# Patient Record
Sex: FEMALE | Race: WHITE | Employment: FULL TIME | URBAN - METROPOLITAN AREA
[De-identification: names, ages, dates, MRNs, and addresses within clinical notes are randomized per-mention and may not be internally consistent; named-entity substitution may affect disease eponyms.]

---

## 2020-09-24 ENCOUNTER — APPOINTMENT (OUTPATIENT)
Dept: GENERAL RADIOLOGY | Age: 55
End: 2020-09-24
Payer: OTHER GOVERNMENT

## 2020-09-24 ENCOUNTER — HOSPITAL ENCOUNTER (EMERGENCY)
Age: 55
Discharge: HOME | End: 2020-09-24
Attending: EMERGENCY MEDICINE
Payer: OTHER GOVERNMENT

## 2020-09-24 VITALS
TEMPERATURE: 98.3 F | WEIGHT: 152 LBS | HEART RATE: 58 BPM | OXYGEN SATURATION: 98 % | SYSTOLIC BLOOD PRESSURE: 147 MMHG | DIASTOLIC BLOOD PRESSURE: 93 MMHG | HEIGHT: 57 IN | RESPIRATION RATE: 16 BRPM | BODY MASS INDEX: 32.79 KG/M2

## 2020-09-24 LAB
ALBUMIN SERPL-MCNC: 4.5 GM/DL (ref 3.4–5)
ALP BLD-CCNC: 84 IU/L (ref 40–129)
ALT SERPL-CCNC: 10 U/L (ref 10–40)
ANION GAP SERPL CALCULATED.3IONS-SCNC: 10 MMOL/L (ref 4–16)
AST SERPL-CCNC: 16 IU/L (ref 15–37)
BASOPHILS ABSOLUTE: 0.1 K/CU MM
BASOPHILS RELATIVE PERCENT: 0.5 % (ref 0–1)
BILIRUB SERPL-MCNC: 0.4 MG/DL (ref 0–1)
BUN BLDV-MCNC: 14 MG/DL (ref 6–23)
CALCIUM SERPL-MCNC: 9.6 MG/DL (ref 8.3–10.6)
CHLORIDE BLD-SCNC: 106 MMOL/L (ref 99–110)
CO2: 26 MMOL/L (ref 21–32)
CREAT SERPL-MCNC: 1 MG/DL (ref 0.6–1.1)
DIFFERENTIAL TYPE: ABNORMAL
EOSINOPHILS ABSOLUTE: 0.2 K/CU MM
EOSINOPHILS RELATIVE PERCENT: 1.5 % (ref 0–3)
GFR AFRICAN AMERICAN: >60 ML/MIN/1.73M2
GFR NON-AFRICAN AMERICAN: 58 ML/MIN/1.73M2
GLUCOSE BLD-MCNC: 94 MG/DL (ref 70–99)
HCT VFR BLD CALC: 39.9 % (ref 37–47)
HEMOGLOBIN: 12.7 GM/DL (ref 12.5–16)
IMMATURE NEUTROPHIL %: 0.2 % (ref 0–0.43)
LYMPHOCYTES ABSOLUTE: 2.7 K/CU MM
LYMPHOCYTES RELATIVE PERCENT: 27 % (ref 24–44)
MCH RBC QN AUTO: 27.7 PG (ref 27–31)
MCHC RBC AUTO-ENTMCNC: 31.8 % (ref 32–36)
MCV RBC AUTO: 87.1 FL (ref 78–100)
MONOCYTES ABSOLUTE: 0.6 K/CU MM
MONOCYTES RELATIVE PERCENT: 6 % (ref 0–4)
PDW BLD-RTO: 13 % (ref 11.7–14.9)
PLATELET # BLD: 338 K/CU MM (ref 140–440)
PMV BLD AUTO: 10.9 FL (ref 7.5–11.1)
POTASSIUM SERPL-SCNC: 4.3 MMOL/L (ref 3.5–5.1)
PRO-BNP: 145.1 PG/ML
RBC # BLD: 4.58 M/CU MM (ref 4.2–5.4)
SEGMENTED NEUTROPHILS ABSOLUTE COUNT: 6.4 K/CU MM
SEGMENTED NEUTROPHILS RELATIVE PERCENT: 64.8 % (ref 36–66)
SODIUM BLD-SCNC: 142 MMOL/L (ref 135–145)
TOTAL IMMATURE NEUTOROPHIL: 0.02 K/CU MM
TOTAL PROTEIN: 7.3 GM/DL (ref 6.4–8.2)
TROPONIN T: <0.01 NG/ML
WBC # BLD: 10 K/CU MM (ref 4–10.5)

## 2020-09-24 PROCEDURE — 80053 COMPREHEN METABOLIC PANEL: CPT

## 2020-09-24 PROCEDURE — 6370000000 HC RX 637 (ALT 250 FOR IP): Performed by: EMERGENCY MEDICINE

## 2020-09-24 PROCEDURE — 99284 EMERGENCY DEPT VISIT MOD MDM: CPT

## 2020-09-24 PROCEDURE — 71046 X-RAY EXAM CHEST 2 VIEWS: CPT

## 2020-09-24 PROCEDURE — 93005 ELECTROCARDIOGRAM TRACING: CPT | Performed by: EMERGENCY MEDICINE

## 2020-09-24 PROCEDURE — 84484 ASSAY OF TROPONIN QUANT: CPT

## 2020-09-24 PROCEDURE — 83880 ASSAY OF NATRIURETIC PEPTIDE: CPT

## 2020-09-24 PROCEDURE — 85025 COMPLETE CBC W/AUTO DIFF WBC: CPT

## 2020-09-24 RX ORDER — METOPROLOL TARTRATE 50 MG/1
50 TABLET, FILM COATED ORAL ONCE
Status: COMPLETED | OUTPATIENT
Start: 2020-09-24 | End: 2020-09-24

## 2020-09-24 RX ORDER — HYDROCHLOROTHIAZIDE 25 MG/1
25 TABLET ORAL ONCE
Status: DISCONTINUED | OUTPATIENT
Start: 2020-09-24 | End: 2020-09-24

## 2020-09-24 RX ADMIN — METOPROLOL TARTRATE 50 MG: 50 TABLET, FILM COATED ORAL at 10:52

## 2020-09-24 NOTE — ED PROVIDER NOTES
[facility] Emergency Department    CHIEF COMPLAINT  Hypertension (212/142)     HISTORY OF PRESENT ILLNESS  Tanika Reddy is a 49 y.o. female  who presents to the ED complaining of incidentally noted HTN yesterday at PCP. She is on losartan. Saw PCP for a wrist injection due to a cyst, went back to PCP today due to HTN and so was sent here for ongoing management. Not on any other HTN medications. No chest pains or SOB, lightheadedness, numbness tingling weakness, dizziness, abd pain n/v/d fevers cough congestion or any other symptoms. No other complaints, modifying factors or associated symptoms. I have reviewed the following from the nursing documentation. Past Medical History:   Diagnosis Date    Hypertension     Migraines      Past Surgical History:   Procedure Laterality Date    CHOLECYSTECTOMY      NASAL SEPTUM SURGERY      VAGINA RECONSTRUCTION SURGERY       History reviewed. No pertinent family history.   Social History     Socioeconomic History    Marital status:      Spouse name: Not on file    Number of children: Not on file    Years of education: Not on file    Highest education level: Not on file   Occupational History    Not on file   Social Needs    Financial resource strain: Not on file    Food insecurity     Worry: Not on file     Inability: Not on file    Transportation needs     Medical: Not on file     Non-medical: Not on file   Tobacco Use    Smoking status: Never Smoker   Substance and Sexual Activity    Alcohol use: No    Drug use: No    Sexual activity: Not on file   Lifestyle    Physical activity     Days per week: Not on file     Minutes per session: Not on file    Stress: Not on file   Relationships    Social connections     Talks on phone: Not on file     Gets together: Not on file     Attends Zoroastrianism service: Not on file     Active member of club or organization: Not on file     Attends meetings of clubs or organizations: Not on file     Relationship status: Not on file    Intimate partner violence     Fear of current or ex partner: Not on file     Emotionally abused: Not on file     Physically abused: Not on file     Forced sexual activity: Not on file   Other Topics Concern    Not on file   Social History Narrative    Not on file     No current facility-administered medications for this encounter. Current Outpatient Medications   Medication Sig Dispense Refill    metoprolol tartrate (LOPRESSOR) 25 MG tablet Take 1 tablet by mouth 2 times daily 60 tablet 0    losartan (COZAAR) 25 MG tablet Take 25 mg by mouth daily.  loratadine (CLARITIN) 10 MG tablet Take 10 mg by mouth daily. No Known Allergies    REVIEW OF SYSTEMS  10 systems reviewed, pertinent positives per HPI otherwise noted to be negative. PHYSICAL EXAM  BP (!) 147/93   Pulse 58   Temp 98.3 °F (36.8 °C) (Temporal)   Resp 16   Ht 4' 9\" (1.448 m)   Wt 152 lb (68.9 kg)   SpO2 98%   BMI 32.89 kg/m²    GENERAL APPEARANCE: Awake and alert. Cooperative. No distress. HENT: Normocephalic. Atraumatic. Mucous membranes are dry. NECK: Supple. EYES: PERRL. EOM's grossly intact. HEART/CHEST: RRR. No murmurs. No chest wall tenderness. LUNGS: Respirations unlabored. CTAB. Good air exchange. Speaking comfortably in full sentences. ABDOMEN: No tenderness. Soft. Non-distended. No masses. No organomegaly. No guarding or rebound. Normal bowel sounds throughout. MUSCULOSKELETAL: No extremity edema. Compartments soft. No deformity. No tenderness in the extremities. All extremities neurovascularly intact. SKIN: Warm and dry. No acute rashes. NEUROLOGICAL: Alert and oriented. CN's 2-12 intact. No gross facial drooping. Strength 5/5, sensation intact. 2 plus DTR's in knees bilaterally. Gait normal.  PSYCHIATRIC: Normal mood and affect. LABS  I have reviewed all labs for this visit.    Results for orders placed or performed during the hospital encounter of 09/24/20   CBC Auto Differential   Result Value Ref Range    WBC 10.0 4.0 - 10.5 K/CU MM    RBC 4.58 4.2 - 5.4 M/CU MM    Hemoglobin 12.7 12.5 - 16.0 GM/DL    Hematocrit 39.9 37 - 47 %    MCV 87.1 78 - 100 FL    MCH 27.7 27 - 31 PG    MCHC 31.8 (L) 32.0 - 36.0 %    RDW 13.0 11.7 - 14.9 %    Platelets 840 450 - 901 K/CU MM    MPV 10.9 7.5 - 11.1 FL    Differential Type AUTOMATED DIFFERENTIAL     Segs Relative 64.8 36 - 66 %    Lymphocytes % 27.0 24 - 44 %    Monocytes % 6.0 (H) 0 - 4 %    Eosinophils % 1.5 0 - 3 %    Basophils % 0.5 0 - 1 %    Segs Absolute 6.4 K/CU MM    Lymphocytes Absolute 2.7 K/CU MM    Monocytes Absolute 0.6 K/CU MM    Eosinophils Absolute 0.2 K/CU MM    Basophils Absolute 0.1 K/CU MM    Immature Neutrophil % 0.2 0 - 0.43 %    Total Immature Neutrophil 0.02 K/CU MM   Comprehensive Metabolic Panel w/ Reflex to MG   Result Value Ref Range    Sodium 142 135 - 145 MMOL/L    Potassium 4.3 3.5 - 5.1 MMOL/L    Chloride 106 99 - 110 mMol/L    CO2 26 21 - 32 MMOL/L    BUN 14 6 - 23 MG/DL    CREATININE 1.0 0.6 - 1.1 MG/DL    Glucose 94 70 - 99 MG/DL    Calcium 9.6 8.3 - 10.6 MG/DL    Alb 4.5 3.4 - 5.0 GM/DL    Total Protein 7.3 6.4 - 8.2 GM/DL    Total Bilirubin 0.4 0.0 - 1.0 MG/DL    ALT 10 10 - 40 U/L    AST 16 15 - 37 IU/L    Alkaline Phosphatase 84 40 - 129 IU/L    GFR Non- 58 (L) >60 mL/min/1.73m2    GFR African American >60 >60 mL/min/1.73m2    Anion Gap 10 4 - 16   Troponin   Result Value Ref Range    Troponin T <0.010 <0.01 NG/ML   Brain Natriuretic Peptide   Result Value Ref Range    Pro-.1 <300 PG/ML   EKG 12 Lead   Result Value Ref Range    Ventricular Rate 55 BPM    Atrial Rate 55 BPM    P-R Interval 192 ms    QRS Duration 84 ms    Q-T Interval 450 ms    QTc Calculation (Bazett) 430 ms    P Axis 50 degrees    R Axis -21 degrees    T Axis -17 degrees    Diagnosis       Sinus bradycardia with sinus arrhythmia  Voltage criteria for left ventricular hypertrophy  Nonspecific T wave abnormality  Abnormal ECG  No previous ECGs available         The 12 lead EKG was interpreted by me as follows:  Rate: bradycardia with a rate of 55  Rhythm: sinus with sinus arrhythmia  Axis: normal  Intervals: normal IA, narrow QRS, normal QTc   LVH  ST segments: no ST elevations or depressions  T waves: no abnormal inversions  Non-specific T wave changes: present  Prior EKG comparison: No prior is currently available for comparison      RADIOLOGY    Xr Chest (2 Vw)    Result Date: 9/24/2020  EXAMINATION: TWO XRAY VIEWS OF THE CHEST 9/24/2020 10:37 am COMPARISON: None. HISTORY: ORDERING SYSTEM PROVIDED HISTORY: HTN TECHNOLOGIST PROVIDED HISTORY: Reason for exam:->HTN Reason for Exam: HTN Acuity: Acute Type of Exam: Initial Relevant Medical/Surgical History: HTN x 2 days FINDINGS: Lungs are clear. Cardiac and mediastinal silhouettes are within normal limits. No pneumothoraces. Bony structures appear intact. Surgical clips to the right upper quadrant. No evidence for acute cardiopulmonary process. ED COURSE/MDM  Patient seen and evaluated. Old records reviewed. Labs and imaging reviewed and results discussed with patient. The patient's ED workup was notable for hypertension without any acute symptoms related to it - specifically no neuro complaints or chest pain/SOB etc.  Labs notable for neg trop, normal BNP, reassuring cbc/cmp. CXR clear and EKG unremarkable. No signs or symptoms of accelerated or malignant hypertension. This is likely chronic. She has given p.o. medications here and improving. Plan for PCP f/u and will start pt on low dose metoprolol in addition to losartan. Defer further long term BP management to PCP. During the patient's ED course, the patient was given:  Medications   metoprolol tartrate (LOPRESSOR) tablet 50 mg (50 mg Oral Given 9/24/20 1052)        CLINICAL IMPRESSION  1.  Essential hypertension        Blood pressure (!) 147/93, pulse 58, temperature 98.3 °F (36.8 °C), temperature source Temporal, resp. rate 16, height 4' 9\" (1.448 m), weight 152 lb (68.9 kg), SpO2 98 %. Macey Moreira was discharged to home in stable condition. I have discussed the findings of today's workup with the patient and addressed the patient's questions and concerns. Important warning signs as well as new or worsening symptoms which would necessitate immediate return to the ED were discussed. The plan is to discharge from the ED at this time, and the patient is in stable condition. The patient acknowledged understanding is agreeable with this plan. Patient was given scripts for the following medications. I counseled patient how to take these medications. New Prescriptions    METOPROLOL TARTRATE (LOPRESSOR) 25 MG TABLET    Take 1 tablet by mouth 2 times daily       Follow-up with:  Your PCP    Schedule an appointment as soon as possible for a visit in 1 week  For symptom re-evaluation    Sukhi Zieglers [id number]  [phone]  Go to   If symptoms worsen      DISCLAIMER: This chart was created using Dragon dictation software. Efforts were made by me to ensure accuracy, however some errors may be present due to limitations of this technology and occasionally words are not transcribed correctly.         Yevgeniy Paredes MD  09/24/20 [id number]

## 2020-09-26 PROCEDURE — 93010 ELECTROCARDIOGRAM REPORT: CPT | Performed by: INTERNAL MEDICINE

## 2020-09-28 LAB
EKG ATRIAL RATE: 55 BPM
EKG DIAGNOSIS: NORMAL
EKG P AXIS: 50 DEGREES
EKG P-R INTERVAL: 192 MS
EKG Q-T INTERVAL: 450 MS
EKG QRS DURATION: 84 MS
EKG QTC CALCULATION (BAZETT): 430 MS
EKG R AXIS: -21 DEGREES
EKG T AXIS: -17 DEGREES
EKG VENTRICULAR RATE: 55 BPM

## 2020-11-22 NOTE — ED TRIAGE NOTES
PT SEEN AT PCP YESTERDAY FOR WRIST PAIN AND STEROID INJECTION  PT STATES HER BP WAS ELEVATED YESTERDAY WAS SEEN AGAIN TODAY TO HAVE BP RECHECKED AND WAS STILL ELEVATED AND SENT TO ER Cognitively Impaired

## 2022-04-06 ENCOUNTER — HOSPITAL ENCOUNTER (EMERGENCY)
Age: 57
Discharge: HOME | End: 2022-04-06
Attending: EMERGENCY MEDICINE
Payer: OTHER GOVERNMENT

## 2022-04-06 VITALS
DIASTOLIC BLOOD PRESSURE: 115 MMHG | BODY MASS INDEX: 31.71 KG/M2 | HEART RATE: 71 BPM | RESPIRATION RATE: 15 BRPM | HEIGHT: 57 IN | OXYGEN SATURATION: 98 % | SYSTOLIC BLOOD PRESSURE: 165 MMHG | TEMPERATURE: 98.1 F | WEIGHT: 147 LBS

## 2022-04-06 DIAGNOSIS — I10 ESSENTIAL HYPERTENSION: ICD-10-CM

## 2022-04-06 DIAGNOSIS — I10 ACCELERATED HYPERTENSION: Primary | ICD-10-CM

## 2022-04-06 LAB
ALBUMIN SERPL-MCNC: 4.6 GM/DL (ref 3.4–5)
ALP BLD-CCNC: 90 IU/L (ref 40–129)
ALT SERPL-CCNC: 12 U/L (ref 10–40)
ANION GAP SERPL CALCULATED.3IONS-SCNC: 12 MMOL/L (ref 4–16)
AST SERPL-CCNC: 18 IU/L (ref 15–37)
BACTERIA: ABNORMAL /HPF
BASOPHILS ABSOLUTE: 0.1 K/CU MM
BASOPHILS RELATIVE PERCENT: 0.5 % (ref 0–1)
BILIRUB SERPL-MCNC: 0.5 MG/DL (ref 0–1)
BILIRUBIN URINE: NEGATIVE MG/DL
BLOOD, URINE: NEGATIVE
BUN BLDV-MCNC: 12 MG/DL (ref 6–23)
CALCIUM SERPL-MCNC: 9.4 MG/DL (ref 8.3–10.6)
CAST TYPE: ABNORMAL /HPF
CHLORIDE BLD-SCNC: 103 MMOL/L (ref 99–110)
CLARITY: CLEAR
CO2: 27 MMOL/L (ref 21–32)
COLOR: YELLOW
CREAT SERPL-MCNC: 0.8 MG/DL (ref 0.6–1.1)
CRYSTAL TYPE: ABNORMAL /HPF
DIFFERENTIAL TYPE: ABNORMAL
EOSINOPHILS ABSOLUTE: 0.2 K/CU MM
EOSINOPHILS RELATIVE PERCENT: 1.8 % (ref 0–3)
EPITHELIAL CELLS, UA: 0 /HPF
GFR AFRICAN AMERICAN: >60 ML/MIN/1.73M2
GFR NON-AFRICAN AMERICAN: >60 ML/MIN/1.73M2
GLUCOSE BLD-MCNC: 88 MG/DL (ref 70–99)
GLUCOSE, URINE: NEGATIVE MG/DL
HCT VFR BLD CALC: 43.9 % (ref 37–47)
HEMOGLOBIN: 13.8 GM/DL (ref 12.5–16)
IMMATURE NEUTROPHIL %: 0.3 % (ref 0–0.43)
KETONES, URINE: NEGATIVE MG/DL
LEUKOCYTE ESTERASE, URINE: ABNORMAL
LYMPHOCYTES ABSOLUTE: 3.4 K/CU MM
LYMPHOCYTES RELATIVE PERCENT: 33.3 % (ref 24–44)
MCH RBC QN AUTO: 27.7 PG (ref 27–31)
MCHC RBC AUTO-ENTMCNC: 31.4 % (ref 32–36)
MCV RBC AUTO: 88.2 FL (ref 78–100)
MONOCYTES ABSOLUTE: 0.7 K/CU MM
MONOCYTES RELATIVE PERCENT: 6.5 % (ref 0–4)
NITRITE URINE, QUANTITATIVE: NEGATIVE
PDW BLD-RTO: 13.9 % (ref 11.7–14.9)
PH, URINE: 7.5 (ref 5–8)
PLATELET # BLD: 357 K/CU MM (ref 140–440)
PMV BLD AUTO: 10.8 FL (ref 7.5–11.1)
POTASSIUM SERPL-SCNC: 3.9 MMOL/L (ref 3.5–5.1)
PROTEIN UA: NEGATIVE MG/DL
RBC # BLD: 4.98 M/CU MM (ref 4.2–5.4)
RBC URINE: 0 /HPF (ref 0–6)
SEGMENTED NEUTROPHILS ABSOLUTE COUNT: 5.8 K/CU MM
SEGMENTED NEUTROPHILS RELATIVE PERCENT: 57.6 % (ref 36–66)
SODIUM BLD-SCNC: 142 MMOL/L (ref 135–145)
SPECIFIC GRAVITY UA: 1.01 (ref 1–1.03)
TOTAL IMMATURE NEUTOROPHIL: 0.03 K/CU MM
TOTAL PROTEIN: 7.7 GM/DL (ref 6.4–8.2)
UROBILINOGEN, URINE: 0.2 MG/DL (ref 0.2–1)
WBC # BLD: 10.1 K/CU MM (ref 4–10.5)
WBC UA: 2 /HPF (ref 0–5)

## 2022-04-06 PROCEDURE — 99283 EMERGENCY DEPT VISIT LOW MDM: CPT

## 2022-04-06 PROCEDURE — 81001 URINALYSIS AUTO W/SCOPE: CPT

## 2022-04-06 PROCEDURE — 80053 COMPREHEN METABOLIC PANEL: CPT

## 2022-04-06 PROCEDURE — 93005 ELECTROCARDIOGRAM TRACING: CPT | Performed by: EMERGENCY MEDICINE

## 2022-04-06 PROCEDURE — 6370000000 HC RX 637 (ALT 250 FOR IP): Performed by: EMERGENCY MEDICINE

## 2022-04-06 PROCEDURE — 85025 COMPLETE CBC W/AUTO DIFF WBC: CPT

## 2022-04-06 RX ORDER — HYDRALAZINE HYDROCHLORIDE 10 MG/1
25 TABLET, FILM COATED ORAL ONCE
Status: COMPLETED | OUTPATIENT
Start: 2022-04-06 | End: 2022-04-06

## 2022-04-06 RX ADMIN — HYDRALAZINE HYDROCHLORIDE 25 MG: 10 TABLET, FILM COATED ORAL at 12:01

## 2022-04-06 NOTE — ED TRIAGE NOTES
Pt was at pcp this am for wrist pain BP was elevated, 180/110 was advised to come to ed for treatment.  Pt did take her BP medications this am, unsure of dose or medicaiton

## 2022-04-06 NOTE — ED PROVIDER NOTES
Emergency Department Encounter    Patient: Alea Hernández  MRN: [id number]  : 1965  Date of Evaluation: 2022  ED Provider:  John Paul Ro MD      Triage Chief Complaint:   Hypertension (180/110 at dr office)      [place]:  Alea Hernández is a 55 y.o. female that presents to the emergency department with concern for elevated blood pressure. Patient was at her primary care doctor's office today for evaluation of ongoing wrist pain. During intake, she was found to have blood pressure of 180/110. She was sent to the emergency department for further evaluation. Patient acknowledges that she has not been taking her antihypertensives as regularly over the past week. She works through the school system, has been on spring break, and things have been \"sporadic\" with her grandchildren being at home. She did take today's dosage prior to arrival.  She denies headache, vision change, dizziness, vertigo, weakness, numbness, or tingling. She denies any chest pain or discomfort, difficulty breathing, abdominal pain, or edema. Patient reports no other particular provocative or alleviating factors. ROS - see HPI, below listed is current ROS at time of my eval:  CONSTITUTIONAL: No fevers, chills, or sweats. EYES: No vision change, redness, drainage, or discharge. HENT: No sore throat, runny nose, or earache. No painful swallowing. RESPIRATORY: No shortness of breath, cough, or sputum production. CARDIOVASCULAR: No anginal-type chest pain, orthopnea, or edema. GASTROINTESTINAL: No nausea, vomiting, or abdominal pain. GENITOURINARY: No frequency, urgency, or dysuria. No hematuria. MUSCULOSKELETAL: No recent injury. No neck, back, or extremity pain. NEUROLOGICAL: No focal weakness, numbness, or tingling. SKIN: No rashes or other lesions reported. No yellowing of the skin.     Medical history:  Past Medical History:   Diagnosis Date    Hypertension     Migraines      Past Surgical History:   Procedure Laterality Date    CHOLECYSTECTOMY      GASTRIC BAND      HYSTERECTOMY      NASAL SEPTUM SURGERY      VAGINA RECONSTRUCTION SURGERY       History reviewed. No pertinent family history. Social History     Socioeconomic History    Marital status:      Spouse name: Not on file    Number of children: Not on file    Years of education: Not on file    Highest education level: Not on file   Occupational History    Not on file   Tobacco Use    Smoking status: Never Smoker    Smokeless tobacco: Not on file   Substance and Sexual Activity    Alcohol use: Yes     Comment: .    Drug use: No    Sexual activity: Not on file   Other Topics Concern    Not on file   Social History Narrative    Not on file     Social Determinants of Health     Financial Resource Strain:     Difficulty of Paying Living Expenses: Not on file   Food Insecurity:     Worried About Running Out of Food in the Last Year: Not on file    Gilberto of Food in the Last Year: Not on file   Transportation Needs:     Lack of Transportation (Medical): Not on file    Lack of Transportation (Non-Medical):  Not on file   Physical Activity:     Days of Exercise per Week: Not on file    Minutes of Exercise per Session: Not on file   Stress:     Feeling of Stress : Not on file   Social Connections:     Frequency of Communication with Friends and Family: Not on file    Frequency of Social Gatherings with Friends and Family: Not on file    Attends Gnosticism Services: Not on file    Active Member of Clubs or Organizations: Not on file    Attends Club or Organization Meetings: Not on file    Marital Status: Not on file   Intimate Partner Violence:     Fear of Current or Ex-Partner: Not on file    Emotionally Abused: Not on file    Physically Abused: Not on file    Sexually Abused: Not on file   Housing Stability:     Unable to Pay for Housing in the Last Year: Not on file    Number of [place] in the Last Year: Not on file    through XII are grossly intact as tested without facial droop or dermatomal paresthesias. Of note, forehead wrinkles are symmetric and intact. Conjugate gaze without entrapment. No asymmetry of the corners of the mouth or nasolabial folds. No gross motor or cerebellar deficits. Motor exam shows 5/5 strength in the bilateral trapezius, biceps, triceps, handgrip, quadriceps, psoas, dorsiflexors, and plantar flexors. No dermatomal paresthesias across the extremities. No ataxia. BACK/MUSCULOSKELETAL: No asymmetric edema or calf tenderness. No Homans sign or cords. SKIN: Normal tone for ethnicity. Normal turgor and brisk capillary refill peripherally. No petechiae, purpura, vesicles, bullae, or other lesions. No icterus. PSYCHIATRIC: Normal mood. Normal affect. Emergency department course. Patient is brought to bed 3 and assessed and reassessed by me. After initial evaluation, orders are placed for medical screening studies including CBC, metabolic panel, and EKG. Patient's blood pressure has been elevated, approximately 196 mmHg during this physician's initial evaluation. Fortunately, she has no associated symptoms. We have discussed College recommendations against aggressive lowering of asymptomatic hypertension. We had discussed providing a single dosage of her routine antihypertensive, but that medication is not available at the stand-alone facility. Hydralazine 25 mg offered instead. Patient is agreeable to continuing plan. Upon most recent reevaluation, blood pressure has shown some improvement, most recently down to about 165 mmHg. Diastolics remain elevated, as well, but she does not report any headaches, vision change, chest pain or discomfort, difficult breathing, or other systemic complaints. Medical screening studies are reassuring. Renal function is intact. EKG shows no significant changes.   Physical exam does not point towards TIA or CVA, and lungs arms are clear, so imaging has been deferred. We have discussed taking medications as prescribed and at about the same times every day. Blood pressures may need several days to come back under control. We have discussed routine monitoring and very careful follow-up with primary care provider. We have discussed all available results. Patient is satisfied with evaluation and agreeable to recommendations. Patient has had the opportunity to ask questions, and they have been answered to the best of my ability. Instructions are given to follow-up with primary care provider for reevaluation and further testing. Very strict return and follow-up instructions are provided. Patient seen during [facility], I did don appropriate PPE during my encounters with the patient, including n95 (when appropriate) mask and eye protection as appropriate.     I have reviewed and interpreted all of the currently available lab results from this visit (if applicable):  Results for orders placed or performed during the hospital encounter of 04/06/22   CBC with Auto Differential   Result Value Ref Range    WBC 10.1 4.0 - 10.5 K/CU MM    RBC 4.98 4.2 - 5.4 M/CU MM    Hemoglobin 13.8 12.5 - 16.0 GM/DL    Hematocrit 43.9 37 - 47 %    MCV 88.2 78 - 100 FL    MCH 27.7 27 - 31 PG    MCHC 31.4 (L) 32.0 - 36.0 %    RDW 13.9 11.7 - 14.9 %    Platelets 559 965 - 864 K/CU MM    MPV 10.8 7.5 - 11.1 FL    Differential Type AUTOMATED DIFFERENTIAL     Segs Relative 57.6 36 - 66 %    Lymphocytes % 33.3 24 - 44 %    Monocytes % 6.5 (H) 0 - 4 %    Eosinophils % 1.8 0 - 3 %    Basophils % 0.5 0 - 1 %    Segs Absolute 5.8 K/CU MM    Lymphocytes Absolute 3.4 K/CU MM    Monocytes Absolute 0.7 K/CU MM    Eosinophils Absolute 0.2 K/CU MM    Basophils Absolute 0.1 K/CU MM    Immature Neutrophil % 0.3 0 - 0.43 %    Total Immature Neutrophil 0.03 K/CU MM   Comprehensive Metabolic Panel w/ Reflex to MG   Result Value Ref Range    Sodium 142 135 - 145 MMOL/L    Potassium 3.9 3.5 - 5.1 MMOL/L    Chloride 103 99 - 110 mMol/L    CO2 27 21 - 32 MMOL/L    BUN 12 6 - 23 MG/DL    CREATININE 0.8 0.6 - 1.1 MG/DL    Glucose 88 70 - 99 MG/DL    Calcium 9.4 8.3 - 10.6 MG/DL    Albumin 4.6 3.4 - 5.0 GM/DL    Total Protein 7.7 6.4 - 8.2 GM/DL    Total Bilirubin 0.5 0.0 - 1.0 MG/DL    ALT 12 10 - 40 U/L    AST 18 15 - 37 IU/L    Alkaline Phosphatase 90 40 - 129 IU/L    GFR Non-African American >60 >60 mL/min/1.73m2    GFR African American >60 >60 mL/min/1.73m2    Anion Gap 12 4 - 16   Urinalysis with Reflex to Culture    Specimen: Urine   Result Value Ref Range    Color, UA YELLOW YELLOW    Clarity, UA CLEAR CLEAR    Glucose, Urine NEGATIVE NEGATIVE MG/DL    Bilirubin Urine NEGATIVE NEGATIVE MG/DL    Ketones, Urine NEGATIVE NEGATIVE MG/DL    Specific Gravity, UA 1.010 1.001 - 1.035    Blood, Urine NEGATIVE NEGATIVE    pH, Urine 7.5 5.0 - 8.0    Protein, UA NEGATIVE NEGATIVE MG/DL    Urobilinogen, Urine 0.2 0.2 - 1.0 MG/DL    Nitrite Urine, Quantitative NEGATIVE NEGATIVE    Leukocyte Esterase, Urine SMALL NUMBER OR AMOUNT OBSERVED (A) NEGATIVE    RBC, UA 0 0 - 6 /HPF    WBC, UA 2 0 - 5 /HPF    Epithelial Cells, UA 0 /HPF    Cast Type NO CAST FORMS SEEN NO CAST FORMS SEEN /HPF    Bacteria, UA OCCASIONAL (A) NEGATIVE /HPF    Crystal Type NONE SEEN (A) NEGATIVE /HPF   EKG 12 Lead   Result Value Ref Range    Ventricular Rate 62 BPM    Atrial Rate 62 BPM    P-R Interval 166 ms    QRS Duration 82 ms    Q-T Interval 424 ms    QTc Calculation (Bazett) 430 ms    P Axis 65 degrees    R Axis -17 degrees    T Axis -4 degrees    Diagnosis       Normal sinus rhythm  Voltage criteria for left ventricular hypertrophy  Abnormal ECG  When compared with ECG of 24-SEP-2020 10:56,  No significant change was found          Radiographs (if obtained):  Radiologist's Report Reviewed:  Deferred.     EKG:  Twelve-lead EKG obtained at 1150 on 6 April 2022 and interpreted by me in the absence of a cardiologist.  There is no criteria ST elevation or reciprocal change. There are no hyperacute T wave changes. There is no sign of acute ischemia or infarction. This tracing shows a normal sinus rhythm with LVH and baseline artifact. Rate and intervals are 62 beats per minute, CO interval 166 milliseconds, QRS duration 82 milliseconds, QTc interval 430 milliseconds, and R axis normal at -17 degrees. There is no acute change compared with the most recent EKG dated September 24, 2020. Medical decision making:  Patient presents to the emergency department with a recent acceleration in blood pressure, likely associated with sporadically taking her medications over the past week. There is no evidence of cerebrovascular accident, transient ischemic attack, or seizure disorder. Patient has no meningeal findings or behavioral changes to suggest higher risk of meningitis, encephalitis, cerebritis, or subarachnoid hemorrhage. I doubt acute coronary syndrome, myocardial infarction, or decompensating congestive heart failure. Patient is low risk for venous thromboembolic disease including pulmonary embolism. There is no asymmetric calf pain or swelling, sustained tachycardia, hypoxia, or cyanosis. Abdominal exam does not suggest mesenteric ischemia, aortic catastrophe, urogenital, or other surgical emergency. There is no sign of other focal infection such as pneumonia or urinary tract infection including pyelonephritis. Sepsis is clinically unlikely. There is no hemodynamic instability, fever, hypoxia, cyanosis, or respiratory distress. There is no intractable vomiting. Blood pressure is elevated, but there has been no complaint of headache, chest pain or discomfort, difficulty breathing, or other symptoms to suggest endorgan injury. Recommendations are given to follow-up with primary care provider for long-term monitoring. Procedures: None. Consultations: None. Clinical Impression:  1. Accelerated hypertension    2.  Essential hypertension      Disposition referral (if applicable):  Dario Mixon, APRN - CNP  [address] [facility] [address]  [phone]    Schedule an appointment as soon as possible for a visit   For primary care follow-up    Samra Valencia MD  [address] [address]  [phone]    Schedule an appointment as soon as possible for a visit   For cardiology follow-up    [address]  [phone]  Go to   As needed, If symptoms worsen    Disposition medications (if applicable):  Current Discharge Medication List        ED Provider Disposition Time  DISPOSITION Decision To Discharge 04/06/2022 12:40:17 PM      Comment: Please note this report has been produced using speech recognition software and may contain errors related to that system including errors in grammar, punctuation, and spelling, as well as words and phrases that may be inappropriate. Efforts were made to edit the dictations.         Dakota Mcnamara MD  04/06/22 8024

## 2022-04-07 LAB
EKG ATRIAL RATE: 62 BPM
EKG DIAGNOSIS: NORMAL
EKG P AXIS: 65 DEGREES
EKG P-R INTERVAL: 166 MS
EKG Q-T INTERVAL: 424 MS
EKG QRS DURATION: 82 MS
EKG QTC CALCULATION (BAZETT): 430 MS
EKG R AXIS: -17 DEGREES
EKG T AXIS: -4 DEGREES
EKG VENTRICULAR RATE: 62 BPM

## 2022-04-07 PROCEDURE — 93010 ELECTROCARDIOGRAM REPORT: CPT | Performed by: INTERNAL MEDICINE

## 2023-06-29 ENCOUNTER — HOSPITAL ENCOUNTER (EMERGENCY)
Age: 58
Discharge: HOME | End: 2023-06-29
Payer: OTHER GOVERNMENT

## 2023-06-29 ENCOUNTER — APPOINTMENT (OUTPATIENT)
Dept: GENERAL RADIOLOGY | Age: 58
End: 2023-06-29
Payer: OTHER GOVERNMENT

## 2023-06-29 VITALS
SYSTOLIC BLOOD PRESSURE: 134 MMHG | DIASTOLIC BLOOD PRESSURE: 83 MMHG | RESPIRATION RATE: 18 BRPM | OXYGEN SATURATION: 97 % | WEIGHT: 154 LBS | HEART RATE: 67 BPM | TEMPERATURE: 98 F | HEIGHT: 58 IN | BODY MASS INDEX: 32.32 KG/M2

## 2023-06-29 DIAGNOSIS — S52.502A CLOSED FRACTURE OF DISTAL END OF LEFT RADIUS, UNSPECIFIED FRACTURE MORPHOLOGY, INITIAL ENCOUNTER: ICD-10-CM
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
## 2023-06-29 PROCEDURE — 99283 EMERGENCY DEPT VISIT LOW MDM: CPT

## 2023-06-29 PROCEDURE — 29125 APPL SHORT ARM SPLINT STATIC: CPT

## 2023-06-29 PROCEDURE — 73110 X-RAY EXAM OF WRIST: CPT

## 2023-06-29 PROCEDURE — 6370000000 HC RX 637 (ALT 250 FOR IP): Performed by: PHYSICIAN ASSISTANT

## 2023-06-29 RX ORDER — NAPROXEN 500 MG/1
500 TABLET ORAL 2 TIMES DAILY WITH MEALS
Qty: 60 TABLET | Refills: 0 | Status: SHIPPED | OUTPATIENT
Start: 2023-06-29

## 2023-06-29 RX ORDER — IBUPROFEN 600 MG/1
600 TABLET ORAL ONCE
Status: DISCONTINUED | OUTPATIENT
Start: 2023-06-29 | End: 2023-06-29 | Stop reason: HOSPADM

## 2023-06-29 RX ORDER — HYDROCODONE BITARTRATE AND ACETAMINOPHEN 5; 325 MG/1; MG/1
1 TABLET ORAL ONCE
Status: COMPLETED | OUTPATIENT
Start: 2023-06-29 | End: 2023-06-29

## 2023-06-29 RX ORDER — HYDROCODONE BITARTRATE AND ACETAMINOPHEN 5; 325 MG/1; MG/1
1 TABLET ORAL EVERY 6 HOURS PRN
Qty: 15 TABLET | Refills: 0 | Status: SHIPPED | OUTPATIENT
Start: 2023-06-29 | End: 2023-07-04

## 2023-06-29 RX ADMIN — HYDROCODONE BITARTRATE AND ACETAMINOPHEN 1 TABLET: 5; 325 TABLET ORAL at 13:46

## 2023-06-29 ASSESSMENT — PAIN DESCRIPTION - LOCATION: LOCATION: WRIST

## 2023-06-29 ASSESSMENT — PAIN DESCRIPTION - ORIENTATION: ORIENTATION: RIGHT

## 2023-06-29 ASSESSMENT — PAIN SCALES - GENERAL: PAINLEVEL_OUTOF10: 10

## 2024-11-28 ENCOUNTER — HOSPITAL ENCOUNTER (EMERGENCY)
Age: 59
Discharge: HOME | End: 2024-11-28
Attending: STUDENT IN AN ORGANIZED HEALTH CARE EDUCATION/TRAINING PROGRAM
Payer: OTHER GOVERNMENT

## 2024-11-28 VITALS
RESPIRATION RATE: 17 BRPM | TEMPERATURE: 97.8 F | DIASTOLIC BLOOD PRESSURE: 114 MMHG | HEIGHT: 58 IN | WEIGHT: 154 LBS | HEART RATE: 73 BPM | SYSTOLIC BLOOD PRESSURE: 211 MMHG | BODY MASS INDEX: 32.32 KG/M2 | OXYGEN SATURATION: 100 %

## 2024-11-28 DIAGNOSIS — I10 ASYMPTOMATIC HYPERTENSION: ICD-10-CM

## 2024-11-28 DIAGNOSIS — H10.9 CONJUNCTIVITIS OF LEFT EYE, UNSPECIFIED CONJUNCTIVITIS TYPE: Primary | ICD-10-CM

## 2024-11-28 PROCEDURE — 99283 EMERGENCY DEPT VISIT LOW MDM: CPT

## 2024-11-28 PROCEDURE — 6370000000 HC RX 637 (ALT 250 FOR IP): Performed by: STUDENT IN AN ORGANIZED HEALTH CARE EDUCATION/TRAINING PROGRAM

## 2024-11-28 RX ORDER — ERYTHROMYCIN 5 MG/G
OINTMENT OPHTHALMIC ONCE
Status: COMPLETED | OUTPATIENT
Start: 2024-11-28 | End: 2024-11-28

## 2024-11-28 RX ORDER — TETRACAINE HYDROCHLORIDE 5 MG/ML
2 SOLUTION OPHTHALMIC ONCE
Status: COMPLETED | OUTPATIENT
Start: 2024-11-28 | End: 2024-11-28

## 2024-11-28 RX ORDER — ERYTHROMYCIN 5 MG/G
OINTMENT OPHTHALMIC
Qty: 3.5 G | Refills: 0 | Status: SHIPPED | OUTPATIENT
Start: 2024-11-28 | End: 2024-12-08

## 2024-11-28 RX ADMIN — FLUORESCEIN SODIUM 1 MG: 1 STRIP OPHTHALMIC at 15:49

## 2024-11-28 RX ADMIN — TETRACAINE HYDROCHLORIDE 2 DROP: 5 SOLUTION OPHTHALMIC at 15:49

## 2024-11-28 RX ADMIN — ERYTHROMYCIN: 5 OINTMENT OPHTHALMIC at 15:49

## 2024-11-28 ASSESSMENT — PAIN - FUNCTIONAL ASSESSMENT: PAIN_FUNCTIONAL_ASSESSMENT: NONE - DENIES PAIN

## 2024-11-28 ASSESSMENT — VISUAL ACUITY: OU: 1

## 2024-11-28 NOTE — ED NOTES
Discharge instructions reviewed with patient. Medication and follow up were discussed. Patient denies further questions and verbalizes understanding

## 2024-11-28 NOTE — DISCHARGE INSTRUCTIONS
Please fill the prescription as provided and take as directed.  I do recommend calling your ophthalmologist to follow-up with in the next couple of days.  If your symptoms do not improve or worsen in any way please return to the emergency department immediately.

## 2024-11-28 NOTE — ED PROVIDER NOTES
foreign bodies.  Visual acuity stable.  No signs of exophthalmos or concerns for orbital cellulitis or entrapment.  Low concern for preseptal or preseptal cellulitis.  No evidence of foreign bodies.  Low concern for acute angle-closure glaucoma suspecting likely continued viral conjunctivitis versus allergic conjunctivitis.  Cannot exclude bacterial.  Given that she has not been on any antibiotic eyedrop drops we will initiate this.  I recommended she call her ophthalmologist tomorrow to schedule a follow-up appointment.  She was given her first dose here.  Recommend she continue to take her blood pressure medication and check it at home.  If it is persistently elevated she may need further evaluation but as of right now she is asymptomatic so I do not believe any further investigation is warranted.  Patient vocalized understanding was agreeable with this plan.             EKG (if obtained): (All EKG's are interpreted by myself in the absence of a cardiologist) none        Physical Exam:   Patient Vitals for the past 24 hrs:   BP Temp Pulse Resp SpO2 Height Weight   11/28/24 1540 (!) 211/114 97.8 °F (36.6 °C) 73 17 100 % 1.473 m (4' 10\") 69.9 kg (154 lb)       Physical Exam  Vitals reviewed.   HENT:      Head: Normocephalic and atraumatic. No right periorbital erythema or left periorbital erythema.      Jaw: There is normal jaw occlusion.      Right Ear: External ear normal.      Left Ear: External ear normal.   Eyes:      General: Lids are normal. Vision grossly intact.         Right eye: No foreign body or discharge.         Left eye: No foreign body.      Extraocular Movements: Extraocular movements intact.      Conjunctiva/sclera:      Right eye: Right conjunctiva is not injected. No chemosis.     Left eye: Left conjunctiva is injected. No chemosis or exudate.     Pupils: Pupils are equal, round, and reactive to light.      Right eye: No corneal abrasion. Clary exam negative.      Left eye: No corneal abrasion.

## 2025-04-24 ENCOUNTER — HOSPITAL ENCOUNTER (EMERGENCY)
Age: 60
Discharge: HOME | End: 2025-04-24
Attending: EMERGENCY MEDICINE
Payer: OTHER GOVERNMENT

## 2025-04-24 ENCOUNTER — APPOINTMENT (OUTPATIENT)
Dept: GENERAL RADIOLOGY | Age: 60
End: 2025-04-24
Payer: OTHER GOVERNMENT

## 2025-04-24 VITALS
BODY MASS INDEX: 32.6 KG/M2 | WEIGHT: 156 LBS | OXYGEN SATURATION: 96 % | RESPIRATION RATE: 18 BRPM | SYSTOLIC BLOOD PRESSURE: 180 MMHG | TEMPERATURE: 98.7 F | DIASTOLIC BLOOD PRESSURE: 115 MMHG | HEART RATE: 70 BPM

## 2025-04-24 DIAGNOSIS — M77.9 BONE SPUR: Primary | ICD-10-CM

## 2025-04-24 PROCEDURE — 73630 X-RAY EXAM OF FOOT: CPT

## 2025-04-24 PROCEDURE — 99283 EMERGENCY DEPT VISIT LOW MDM: CPT

## 2025-04-24 ASSESSMENT — PAIN DESCRIPTION - ORIENTATION: ORIENTATION: RIGHT

## 2025-04-24 ASSESSMENT — LIFESTYLE VARIABLES
HOW OFTEN DO YOU HAVE A DRINK CONTAINING ALCOHOL: NEVER
HOW MANY STANDARD DRINKS CONTAINING ALCOHOL DO YOU HAVE ON A TYPICAL DAY: PATIENT DOES NOT DRINK

## 2025-04-24 ASSESSMENT — PAIN DESCRIPTION - LOCATION: LOCATION: FOOT;ANKLE

## 2025-04-24 ASSESSMENT — PAIN - FUNCTIONAL ASSESSMENT: PAIN_FUNCTIONAL_ASSESSMENT: 0-10

## 2025-04-24 ASSESSMENT — PAIN DESCRIPTION - DESCRIPTORS: DESCRIPTORS: ACHING;SHARP

## 2025-04-24 ASSESSMENT — PAIN SCALES - GENERAL: PAINLEVEL_OUTOF10: 10

## 2025-04-24 NOTE — DISCHARGE INSTRUCTIONS
Return immediately to the emergency department if you experience new or worsened symptoms, increased pain, inability to walk, changes in color sensation of your foot, or for any other concerns.

## 2025-04-24 NOTE — ED NOTES
Patient presents to Ed with complaints of right foot pain, reports that she has had increased pain to back of foot for about a month, reports no shoe is comfortable but today she was at work and felt a pop in back of ankle, heel. ( Achilles)  Unable to bare minimal weight on right foot.

## 2025-04-24 NOTE — ED PROVIDER NOTES
Emergency Department Encounter    Patient: Karen Laguna  MRN: [id number]  : 1965  Date of Evaluation: 2025  ED Provider:  Luis Tolliver MD    Triage Chief Complaint:   Foot Pain (Right, foot back of heel pain on going for a month, painful reports today heard a \"pop\" )    Inupiat:  Karen Laguna is a 59 y.o. female that presents complaining of 2-week history of pain in the region of the right Achilles/posterior right heel which worsened today after the patient stepped off of a bus.  Patient states that she heard a pop at the time of worsening.  No other direct injury or trauma.  No significant calf pain.  No lower extremity edema.  No breaks in the skin.    ROS - see HPI, below listed is current ROS at time of my eval:  General:  No fevers  Musculoskeletal:  No muscle pain, no joint pain  Skin:  No rash, no pruritis, no easy bruising  Neurologic:   no extremity numbness, no extremity tingling, no extremity weakness  Extremities: Pain to the posterior right heel as above.    Past Medical History:   Diagnosis Date    Hypertension     Migraines      Past Surgical History:   Procedure Laterality Date    CHOLECYSTECTOMY      GASTRIC BAND      HYSTERECTOMY (CERVIX STATUS UNKNOWN)      NASAL SEPTUM SURGERY      VAGINA RECONSTRUCTION SURGERY       History reviewed. No pertinent family history.  Social History     Socioeconomic History    Marital status:      Spouse name: Not on file    Number of children: Not on file    Years of education: Not on file    Highest education level: Not on file   Occupational History    Not on file   Tobacco Use    Smoking status: Never     Passive exposure: Past    Smokeless tobacco: Not on file   Substance and Sexual Activity    Alcohol use: Yes     Comment: .    Drug use: No    Sexual activity: Not on file   Other Topics Concern    Not on file   Social History Narrative    Not on file     Social Drivers of Health     Financial Resource Strain: Not on file   Food Insecurity: